# Patient Record
Sex: FEMALE | Race: WHITE
[De-identification: names, ages, dates, MRNs, and addresses within clinical notes are randomized per-mention and may not be internally consistent; named-entity substitution may affect disease eponyms.]

---

## 2018-04-27 ENCOUNTER — HOSPITAL ENCOUNTER (OUTPATIENT)
Dept: HOSPITAL 89 - ZZGRANDUC | Age: 28
End: 2018-04-27
Attending: PHYSICIAN ASSISTANT
Payer: COMMERCIAL

## 2018-04-27 DIAGNOSIS — R42: Primary | ICD-10-CM

## 2018-04-27 LAB — PLATELET COUNT, AUTOMATED: 204 K/UL (ref 150–450)

## 2018-04-27 PROCEDURE — 82947 ASSAY GLUCOSE BLOOD QUANT: CPT

## 2018-04-27 PROCEDURE — 82565 ASSAY OF CREATININE: CPT

## 2018-04-27 PROCEDURE — 82310 ASSAY OF CALCIUM: CPT

## 2018-04-27 PROCEDURE — 82374 ASSAY BLOOD CARBON DIOXIDE: CPT

## 2018-04-27 PROCEDURE — 82435 ASSAY OF BLOOD CHLORIDE: CPT

## 2018-04-27 PROCEDURE — 82040 ASSAY OF SERUM ALBUMIN: CPT

## 2018-04-27 PROCEDURE — 84520 ASSAY OF UREA NITROGEN: CPT

## 2018-04-27 PROCEDURE — 84295 ASSAY OF SERUM SODIUM: CPT

## 2018-04-27 PROCEDURE — 84132 ASSAY OF SERUM POTASSIUM: CPT

## 2018-04-27 PROCEDURE — 84155 ASSAY OF PROTEIN SERUM: CPT

## 2018-04-27 PROCEDURE — 84075 ASSAY ALKALINE PHOSPHATASE: CPT

## 2018-04-27 PROCEDURE — 84450 TRANSFERASE (AST) (SGOT): CPT

## 2018-04-27 PROCEDURE — 84460 ALANINE AMINO (ALT) (SGPT): CPT

## 2018-04-27 PROCEDURE — 82247 BILIRUBIN TOTAL: CPT

## 2018-04-27 PROCEDURE — 85025 COMPLETE CBC W/AUTO DIFF WBC: CPT

## 2018-05-29 ENCOUNTER — HOSPITAL ENCOUNTER (OUTPATIENT)
Dept: HOSPITAL 89 - US | Age: 28
End: 2018-05-29
Attending: INTERNAL MEDICINE
Payer: COMMERCIAL

## 2018-05-29 DIAGNOSIS — R55: Primary | ICD-10-CM

## 2018-05-29 PROCEDURE — 93306 TTE W/DOPPLER COMPLETE: CPT

## 2018-05-31 NOTE — RADIOLOGY IMAGING REPORT
FACILITY: [facility] 

 

PATIENT NAME: DAGMAR REICH

: [id number]

MR: [id number]

V: [id number]

EXAM DATE: 

ORDERING PHYSICIAN: TATI SALDIVAR

TECHNOLOGIST: Jesusita Waddell

 

EXAMINATION:ECHOCARDIOGRAPH

 

 INDICATIONS: VASOVAGAL SYNCOPE

 

LEFT VENTRICLE: Normal size & function, ejection fraction estimated 

at 60-65% with normal diastolic function.

RIGHT VENTRICLE: Normal size & function.

RIGHT ATRIUM: Normal size & function.

LEFT ATRIUM: Normal size & function.

AORTIC VALVE: Trileaflet, no evidence of stenosis or regurgitation.

PULMONIC VALVE: Poorly seen, no evidence of significant stenosis or 

regurgitation.

MITRAL VALVE: No evidence of significant stenosis or regurgitation.

TRICUSPID VALVE: No evidence of significant regurgitation or stenosis.

AORTIC VALVE:

PERICARDIUM: No evidence of pericardial effusion

GREAT VESSELS: Aorta within normal size within normal limits.

EXTRA CARDIAC: No evidence of pleural effusion.

 

CONCLUSION: 

1. Ejection fraction 60-65%.

2. No hemodynamically significant valvular dysfunction.

 

 

 

 

Dictated by: Moustapha Barahona M.D. on 2018 at 18:38   

Transcribed by: RAIMUNDO on 2018 at 9:03    

Approved by: Moustapha Barahona M.D. on 2018 at 11:59   

[facility]